# Patient Record
Sex: MALE | Race: WHITE | Employment: FULL TIME | URBAN - METROPOLITAN AREA
[De-identification: names, ages, dates, MRNs, and addresses within clinical notes are randomized per-mention and may not be internally consistent; named-entity substitution may affect disease eponyms.]

---

## 2024-07-31 ENCOUNTER — APPOINTMENT (OUTPATIENT)
Dept: CT IMAGING | Age: 49
End: 2024-07-31

## 2024-07-31 ENCOUNTER — HOSPITAL ENCOUNTER (EMERGENCY)
Age: 49
Discharge: HOME | End: 2024-07-31
Attending: EMERGENCY MEDICINE

## 2024-07-31 VITALS
BODY MASS INDEX: 37.2 KG/M2 | TEMPERATURE: 98.4 F | HEIGHT: 75 IN | RESPIRATION RATE: 18 BRPM | DIASTOLIC BLOOD PRESSURE: 76 MMHG | HEART RATE: 61 BPM | WEIGHT: 299.2 LBS | SYSTOLIC BLOOD PRESSURE: 124 MMHG | OXYGEN SATURATION: 94 %

## 2024-07-31 DIAGNOSIS — K86.1 ACUTE ON CHRONIC PANCREATITIS (HCC): Primary | ICD-10-CM

## 2024-07-31 DIAGNOSIS — K85.90 ACUTE ON CHRONIC PANCREATITIS (HCC): Primary | ICD-10-CM

## 2024-07-31 LAB
ALBUMIN SERPL-MCNC: 4.1 G/DL (ref 3.4–5)
ALP SERPL-CCNC: 81 U/L (ref 40–129)
ALT SERPL-CCNC: 24 U/L (ref 10–40)
ANION GAP SERPL CALCULATED.3IONS-SCNC: 10 MMOL/L (ref 3–16)
AST SERPL-CCNC: 28 U/L (ref 15–37)
BASOPHILS # BLD: 0 K/UL (ref 0–0.2)
BASOPHILS NFR BLD: 0.7 %
BILIRUB DIRECT SERPL-MCNC: <0.2 MG/DL (ref 0–0.3)
BILIRUB INDIRECT SERPL-MCNC: NORMAL MG/DL (ref 0–1)
BILIRUB SERPL-MCNC: 0.6 MG/DL (ref 0–1)
BUN SERPL-MCNC: 9 MG/DL (ref 7–20)
CALCIUM SERPL-MCNC: 8.6 MG/DL (ref 8.3–10.6)
CHLORIDE SERPL-SCNC: 103 MMOL/L (ref 99–110)
CO2 SERPL-SCNC: 25 MMOL/L (ref 21–32)
CREAT SERPL-MCNC: 1.1 MG/DL (ref 0.9–1.3)
DEPRECATED RDW RBC AUTO: 16 % (ref 12.4–15.4)
EOSINOPHIL # BLD: 0.2 K/UL (ref 0–0.6)
EOSINOPHIL NFR BLD: 2.8 %
GFR SERPLBLD CREATININE-BSD FMLA CKD-EPI: 83 ML/MIN/{1.73_M2}
GLUCOSE SERPL-MCNC: 126 MG/DL (ref 70–99)
HCT VFR BLD AUTO: 40.1 % (ref 40.5–52.5)
HGB BLD-MCNC: 13.2 G/DL (ref 13.5–17.5)
LIPASE SERPL-CCNC: 66 U/L (ref 13–60)
LYMPHOCYTES # BLD: 1.1 K/UL (ref 1–5.1)
LYMPHOCYTES NFR BLD: 20.3 %
MCH RBC QN AUTO: 29 PG (ref 26–34)
MCHC RBC AUTO-ENTMCNC: 32.8 G/DL (ref 31–36)
MCV RBC AUTO: 88.4 FL (ref 80–100)
MONOCYTES # BLD: 0.6 K/UL (ref 0–1.3)
MONOCYTES NFR BLD: 10.1 %
NEUTROPHILS # BLD: 3.6 K/UL (ref 1.7–7.7)
NEUTROPHILS NFR BLD: 66.1 %
PLATELET # BLD AUTO: 241 K/UL (ref 135–450)
PMV BLD AUTO: 7.1 FL (ref 5–10.5)
POTASSIUM SERPL-SCNC: 3.9 MMOL/L (ref 3.5–5.1)
PROT SERPL-MCNC: 6.5 G/DL (ref 6.4–8.2)
RBC # BLD AUTO: 4.54 M/UL (ref 4.2–5.9)
SODIUM SERPL-SCNC: 138 MMOL/L (ref 136–145)
WBC # BLD AUTO: 5.5 K/UL (ref 4–11)

## 2024-07-31 PROCEDURE — 2580000003 HC RX 258

## 2024-07-31 PROCEDURE — 6360000004 HC RX CONTRAST MEDICATION: Performed by: EMERGENCY MEDICINE

## 2024-07-31 PROCEDURE — 96376 TX/PRO/DX INJ SAME DRUG ADON: CPT

## 2024-07-31 PROCEDURE — 85025 COMPLETE CBC W/AUTO DIFF WBC: CPT

## 2024-07-31 PROCEDURE — 99285 EMERGENCY DEPT VISIT HI MDM: CPT

## 2024-07-31 PROCEDURE — 80048 BASIC METABOLIC PNL TOTAL CA: CPT

## 2024-07-31 PROCEDURE — 96374 THER/PROPH/DIAG INJ IV PUSH: CPT

## 2024-07-31 PROCEDURE — 96375 TX/PRO/DX INJ NEW DRUG ADDON: CPT

## 2024-07-31 PROCEDURE — 96361 HYDRATE IV INFUSION ADD-ON: CPT

## 2024-07-31 PROCEDURE — 6360000002 HC RX W HCPCS

## 2024-07-31 PROCEDURE — 74177 CT ABD & PELVIS W/CONTRAST: CPT

## 2024-07-31 PROCEDURE — 83690 ASSAY OF LIPASE: CPT

## 2024-07-31 PROCEDURE — 80076 HEPATIC FUNCTION PANEL: CPT

## 2024-07-31 RX ORDER — HYDROMORPHONE HYDROCHLORIDE 1 MG/ML
1 INJECTION, SOLUTION INTRAMUSCULAR; INTRAVENOUS; SUBCUTANEOUS ONCE
Status: COMPLETED | OUTPATIENT
Start: 2024-07-31 | End: 2024-07-31

## 2024-07-31 RX ORDER — ONDANSETRON 2 MG/ML
4 INJECTION INTRAMUSCULAR; INTRAVENOUS ONCE
Status: COMPLETED | OUTPATIENT
Start: 2024-07-31 | End: 2024-07-31

## 2024-07-31 RX ORDER — 0.9 % SODIUM CHLORIDE 0.9 %
500 INTRAVENOUS SOLUTION INTRAVENOUS ONCE
Status: COMPLETED | OUTPATIENT
Start: 2024-07-31 | End: 2024-07-31

## 2024-07-31 RX ADMIN — ONDANSETRON 4 MG: 2 INJECTION INTRAMUSCULAR; INTRAVENOUS at 19:21

## 2024-07-31 RX ADMIN — HYDROMORPHONE HYDROCHLORIDE 1 MG: 1 INJECTION, SOLUTION INTRAMUSCULAR; INTRAVENOUS; SUBCUTANEOUS at 20:17

## 2024-07-31 RX ADMIN — SODIUM CHLORIDE 500 ML: 9 INJECTION, SOLUTION INTRAVENOUS at 19:38

## 2024-07-31 RX ADMIN — IOPAMIDOL 75 ML: 755 INJECTION, SOLUTION INTRAVENOUS at 21:35

## 2024-07-31 RX ADMIN — HYDROMORPHONE HYDROCHLORIDE 1 MG: 1 INJECTION, SOLUTION INTRAMUSCULAR; INTRAVENOUS; SUBCUTANEOUS at 19:24

## 2024-07-31 RX ADMIN — HYDROMORPHONE HYDROCHLORIDE 1 MG: 1 INJECTION, SOLUTION INTRAMUSCULAR; INTRAVENOUS; SUBCUTANEOUS at 21:50

## 2024-07-31 ASSESSMENT — PAIN DESCRIPTION - ORIENTATION
ORIENTATION: UPPER
ORIENTATION: LEFT

## 2024-07-31 ASSESSMENT — ENCOUNTER SYMPTOMS
ABDOMINAL PAIN: 1
VOMITING: 1
WHEEZING: 0
DIARRHEA: 0
BLOOD IN STOOL: 0
SHORTNESS OF BREATH: 0
NAUSEA: 1
CONSTIPATION: 0
CHEST TIGHTNESS: 0

## 2024-07-31 ASSESSMENT — PAIN - FUNCTIONAL ASSESSMENT: PAIN_FUNCTIONAL_ASSESSMENT: 0-10

## 2024-07-31 ASSESSMENT — PAIN DESCRIPTION - LOCATION
LOCATION: ABDOMEN
LOCATION: BACK;ABDOMEN
LOCATION: ABDOMEN
LOCATION: ABDOMEN;BACK

## 2024-07-31 ASSESSMENT — PAIN DESCRIPTION - DESCRIPTORS
DESCRIPTORS: SHARP
DESCRIPTORS: ACHING
DESCRIPTORS: BURNING
DESCRIPTORS: ACHING

## 2024-07-31 ASSESSMENT — PAIN SCALES - GENERAL
PAINLEVEL_OUTOF10: 9
PAINLEVEL_OUTOF10: 8
PAINLEVEL_OUTOF10: 6
PAINLEVEL_OUTOF10: 9

## 2024-07-31 ASSESSMENT — PAIN DESCRIPTION - FREQUENCY: FREQUENCY: CONTINUOUS

## 2024-07-31 NOTE — ED PROVIDER NOTES
ED Attending Attestation Note     Date of evaluation: 7/31/2024    This patient was seen by the resident.  I have seen and examined the patient, agree with the workup, evaluation, management and diagnosis. The care plan has been discussed.      Briefly, Charbel Bruner is a 49 y.o. male with a PMH inclusive of pancreatitis who presents for evaluation of abdominal pain. Was seen in ED earlier this week for same complaint. Feels like prior episodes of pancreatitis. Upper pain, radiating to back. No CP or SOB.     Notable exam findings include mild diffuse abd tenderness, worse in upper abdomen    Assessment/ Medical Decision Making:     Patient with sx identical to prior episodes of pancreatitis. Exam and vitals reassuring. Will treat symptomatically, check labs, and reassess. Low threshold for imaging given this is his second visit this week.        Elmer Springer MD  07/31/24 1923

## 2024-07-31 NOTE — ED PROVIDER NOTES
[facility]  EMERGENCY DEPARTMENT ENCOUNTER          [facility] RESIDENT NOTE       Date of evaluation: 7/31/2024    Chief Complaint     Abdominal Pain and Emesis (Pt. Presents to ED with c/o upper abd pain and vomiting past three days. PMHx. Pancreatitis. )      History of Present Illness     Charbel Bruner is a 49 y.o. male who presents with abdominal pain that radiates to the back associated with N/V since this morning. He has had numerous episodes of non-bloody vomiting and has been unable to eat or drink anything. Patient has a history of triglyceride pancreatitis with recurrent flare-ups with multiple ED visits. He was recently here in the ED on Sunday for a similar presentation where he was given fluids, antiemetics and pain control and was discharged afterwards. Patient has a familial hypertriglyceridemia condition and follows up with his PCP regularly. This morning is when the pain started again. He describes it as 9/10 epigastric pain with radiation to his back. Denies hematemesis, chest pain, shortness of breath, constipation or melena.    Patient denies having any cardiac history such as previous myocardial infarctions, heart failure or AAA. Patient follows up with a doctor every 6 months for screenings and is compliant with all his medications. He endorses being otherwise healthy in spite of his chronic pancreatitis. Patient does not drink alcohol, smoke tobacco or use any recreational drugs.    Patient has been dealing with the recent passing of his father. However, he denies any specific triggers that cause exacerbation.     ASSESSMENT / PLAN  (MEDICAL DECISION MAKING)     INITIAL VITALS: BP: 128/79, Temp: 98.4 °F (36.9 °C), Pulse: 80, Respirations: 18, SpO2: 96 %     Charbel Bruner is a 49 y.o. male presents with recurrent abdominal pain related to his previous episodes of pancreatitis fares. Just a few days ago (Sunday), patient visited the ED for similar presentation which resolved with IVF,

## 2024-08-01 NOTE — DISCHARGE INSTRUCTIONS
We saw you in the hospital for acute pancreatitis in the setting of chronic pancreatitis.     You were treated with fluids, dilaudid and zofran.     You need to see your regular doctor in 1 day to be checked.     You should return to the emergency department if your symptoms worsen or do not resolve. In addition, return if:  - You have a fever (greater than 101 degrees)  - You have chest pain, shortness of breath, abdominal pain, or uncontrollable vomiting  - You are unable to eat or drink  - You pass out  - You have difficulty moving your arms or legs   - You have difficulty speaking or slurred speech  - Or you have any concern that you feel needs acute physician evaluation.